# Patient Record
Sex: FEMALE | Race: BLACK OR AFRICAN AMERICAN
[De-identification: names, ages, dates, MRNs, and addresses within clinical notes are randomized per-mention and may not be internally consistent; named-entity substitution may affect disease eponyms.]

---

## 2022-12-28 ENCOUNTER — HOSPITAL ENCOUNTER (EMERGENCY)
Dept: HOSPITAL 97 - ER | Age: 37
Discharge: HOME | End: 2022-12-28
Payer: COMMERCIAL

## 2022-12-28 VITALS — TEMPERATURE: 98.3 F

## 2022-12-28 VITALS — DIASTOLIC BLOOD PRESSURE: 69 MMHG | OXYGEN SATURATION: 98 % | SYSTOLIC BLOOD PRESSURE: 113 MMHG

## 2022-12-28 DIAGNOSIS — O26.891: Primary | ICD-10-CM

## 2022-12-28 DIAGNOSIS — Z3A.08: ICD-10-CM

## 2022-12-28 LAB
ALBUMIN SERPL BCP-MCNC: 3 G/DL (ref 3.4–5)
ALP SERPL-CCNC: 58 U/L (ref 45–117)
ALT SERPL W P-5'-P-CCNC: 19 U/L (ref 13–56)
AST SERPL W P-5'-P-CCNC: 9 U/L (ref 15–37)
BUN BLD-MCNC: 4 MG/DL (ref 7–18)
GLUCOSE SERPLBLD-MCNC: 89 MG/DL (ref 74–106)
HCT VFR BLD CALC: 31.6 % (ref 36–45)
LIPASE SERPL-CCNC: 136 U/L (ref 73–393)
LYMPHOCYTES # SPEC AUTO: 0.6 K/UL (ref 0.7–4.9)
MCV RBC: 75 FL (ref 80–100)
PMV BLD: 9.4 FL (ref 7.6–11.3)
POTASSIUM SERPL-SCNC: 3.6 MMOL/L (ref 3.5–5.1)
RBC # BLD: 4.21 M/UL (ref 3.86–4.86)
SP GR UR: 1.01 (ref 1–1.03)

## 2022-12-28 PROCEDURE — 81015 MICROSCOPIC EXAM OF URINE: CPT

## 2022-12-28 PROCEDURE — 80053 COMPREHEN METABOLIC PANEL: CPT

## 2022-12-28 PROCEDURE — 84702 CHORIONIC GONADOTROPIN TEST: CPT

## 2022-12-28 PROCEDURE — 76817 TRANSVAGINAL US OBSTETRIC: CPT

## 2022-12-28 PROCEDURE — 83690 ASSAY OF LIPASE: CPT

## 2022-12-28 PROCEDURE — 85025 COMPLETE CBC W/AUTO DIFF WBC: CPT

## 2022-12-28 PROCEDURE — 36415 COLL VENOUS BLD VENIPUNCTURE: CPT

## 2022-12-28 PROCEDURE — 81025 URINE PREGNANCY TEST: CPT

## 2022-12-28 PROCEDURE — 81003 URINALYSIS AUTO W/O SCOPE: CPT

## 2022-12-28 NOTE — RAD REPORT
EXAM DESCRIPTION:  US - Transvaginal OB - 12/28/2022 9:18 am

 

CLINICAL HISTORY:  ABD CRAMPING, PREGNANT

 

COMPARISON:  No comparisons

 

FINDINGS:  Single IUP identified. The crown-rump length of the fetus measures 2.5 cm which is consist
ent with 8 weeks 6 day. The gestational sac has a mean sac diameter of 4.9 cm.

 

Fetal heart rate is measured at 169 beats/minute.

 

The uterus measures 12 x 7.2 x 7.9 cm with volume of 358 cc. The right ovary measures 4 x 3.8 x 3.5 c
m with volume of 27.6 cc. The left ovary measures 2.9 x 1.9 x 1.9 cm with volume of 5.5 cc. Bilateral
 ovarian blood flow is present. Right ovarian cyst measuring up to 3.2 cm accounting for discrepancie
s in the size of the ovaries.

 

IMPRESSION:  Single IUP identified with positive fetal heart tones measuring 8 weeks 6 day with SANDHYA o
f 08/03/2023.

 

Bilateral ovarian blood flow present.

## 2022-12-28 NOTE — ER
Nurse's Notes                                                                                     

 [facility]                                                                 

Name: Jorge Flood                                                                               

Age: 37 yrs                                                                                       

Sex: Female                                                                                       

: 1985                                                                                   

MRN: [id number]                                                                                   

Arrival Date: 2022                                                                          

Time: 06:45                                                                                       

Account#: [id number]                                                                            

Bed 4                                                                                             

Private MD:                                                                                       

Diagnosis: Pregnancy related conditions, unspecified                                              

                                                                                                  

Presentation:                                                                                     

                                                                                             

06:55 Chief complaint: Patient states: "I have been having abdominal pain for the past two    tw5 

      days mostly on my right side. I thought it was gas at first but it hasn't gotten            

      better.". Coronavirus screen: Vaccine status: Patient reports receiving the 2nd dose of     

      the covid vaccine. Moderna. Ebola Screen: Patient negative for fever greater than or        

      equal to 101.5 degrees Fahrenheit, and additional compatible Ebola Virus Disease            

      symptoms Patient denies exposure to infectious person. Patient denies travel to an          

      Ebola-affected area in the 21 days before illness onset. Initial Sepsis Screen:. Risk       

      Assessment: Do you want to hurt yourself or someone else? Patient reports no desire to      

      harm self or others. Onset of symptoms was 2022.                               

06:55 Method Of Arrival: Ambulatory                                                           tw5 

06:55 Acuity: NICHOLE 3                                                                           tw5 

                                                                                                  

Triage Assessment:                                                                                

06:57 General: Appears uncomfortable, Behavior is calm, cooperative, appropriate for age.     tw5 

      Pain: Complains of pain in right upper quadrant and right lower quadrant Pain currently     

      is 8 out of 10 on a pain scale. GI: Reports cramping.                                       

                                                                                                  

OB/GYN:                                                                                           

06:57 LMP 2022                                                                           tw5 

                                                                                                  

Historical:                                                                                       

- Allergies:                                                                                      

06:57 Bactrim;                                                                                tw5 

06:57 Latex, Natural Rubber;                                                                  tw5 

06:57 PENICILLINS;                                                                            tw5 

- PMHx:                                                                                           

06:57 Hypertensive disorder; Anemia;                                                          tw5 

- PSHx:                                                                                           

06:57 gastric bypass;                                                                         tw5 

                                                                                                  

- Immunization history:: Flu vaccine is up to date.                                               

- Social history:: Smoking status: Patient denies any tobacco usage or history of.                

- Family history:: not pertinent.                                                                 

                                                                                                  

                                                                                                  

Assessment:                                                                                       

07:54 General: Appears in no apparent distress. comfortable, Behavior is calm, cooperative,   kr3 

      appropriate for age. Pain: Complains of pain in abdomen. Neuro: Level of Consciousness      

      is awake, alert, obeys commands, Oriented to person, place, time, situation.                

      Cardiovascular: Patient's skin is warm and dry. Respiratory: Airway is patent               

      Respiratory effort is even, unlabored, Respiratory pattern is regular, symmetrical. GI:     

      Abdomen is round non-distended. : No signs and/or symptoms were reported regarding        

      the genitourinary system. EENT: No signs and/or symptoms were reported regarding the        

      EENT system. Derm: No signs and/or symptoms reported regarding the dermatologic system.     

      Musculoskeletal: Circulation, motion, and sensation intact.                                 

                                                                                                  

Vital Signs:                                                                                      

06:55 Weight 123.38 kg; Height 5 ft. 8 in. (172.72 cm);                                       tw5 

07:10  / 86; Pulse 71; Resp 18; Temp 98.3; Pulse Ox 97% on R/A;                         kr3 

07:55  / 69; Pulse 68; Resp 18; Pulse Ox 98% on R/A;                                    kr3 

06:55 Body Mass Index 41.36 (123.38 kg, 172.72 cm)                                            tw5 

                                                                                                  

ED Course:                                                                                        

06:45 Patient arrived in ED.                                                                  jj6 

06:54 Porsche Rosenbaum is Primary Nurse.                                                         tw5 

06:57 Triage completed.                                                                       tw5 

06:57 Arm band placed on Patient placed in an exam room.                                      tw5 

06:59 Patient has correct armband on for positive identification. Placed in gown. Bed in low  tw5 

      position. Call light in reach. Side rails up X 1.                                           

07:05 Valentino Ibarra MD is Attending Physician.                                            rt  

07:28 UA MICROSCOPIC Sent.                                                                    kr3 

07:28 Inserted saline lock: 22 gauge in left antecubital area, using aseptic technique. Blood kr3 

      collected.                                                                                  

09:20 Transvaginal OB US In Process Unspecified.                                              EDMS

09:49 No provider procedures requiring assistance completed. IV discontinued, intact,         iw  

      bleeding controlled, No redness/swelling at site. Pressure dressing applied.                

                                                                                                  

Administered Medications:                                                                         

07:38 Drug: Zofran (Ondansetron) 4 mg Route: IVP; Site: left antecubital;                     kr3 

07:38 Drug: NS 0.9% 1000 ml Route: IV; Rate: 1 bolus; Site: left antecubital;                 kr3 

                                                                                                  

                                                                                                  

Outcome:                                                                                          

09:36 Discharge ordered by MD.                                                                rt  

10:03 Patient left the ED.                                                                    iw  

                                                                                                  

Signatures:                                                                                       

Dispatcher [employer]                           EDMS                                                 

Meenakshi Rowland RN                     RN   iw                                                   

Porsche Rosenbaum                                tw5                                                  

Deisy [id number]                                                  

Bethanie Cid, RN                        RN   kr3                                                  

Valentino Ibarra MD MD   rt                                                   

                                                                                                  

Corrections: (The following items were deleted from the chart)                                    

07:41 07:28 Inserted saline lock: 22 gauge in right antecubital area, using aseptic           kr3 

      technique. Blood collected. kr3                                                             

                                                                                                  

**************************************************************************************************

## 2022-12-28 NOTE — EDPHYS
Physician Documentation                                                                           

 [facility]                                                                 

Name: Jorge Flood                                                                               

Age: 37 yrs                                                                                       

Sex: Female                                                                                       

: 1985                                                                                   

MRN: [id number]                                                                                   

Arrival Date: 2022                                                                          

Time: 06:45                                                                                       

Account#: [id number]                                                                            

Bed 4                                                                                             

Private MD:                                                                                       

ED Physician Valentino Ibarra                                                                     

HPI:                                                                                              

                                                                                             

07:51 This 37 yrs old Black Female presents to ER via Ambulatory with complaints of Abdominal rt  

      Cramping.                                                                                   

07:51 Presents to the ED with 2 days of constant abdominal cramping, nonradiating. Symptoms   rt  

      are neither improving nor worsening. She states that she gets nauseated with eating but     

      states that the pain is not worse with eating. She denies other aggravating or              

      alleviating factors. Denies other associated symptoms. Symptoms are moderate severity,      

      no other aggravating alleviating factors..                                                  

                                                                                                  

OB/GYN:                                                                                           

06:57 LMP 2022                                                                           tw5 

                                                                                                  

Historical:                                                                                       

- Allergies:                                                                                      

06:57 Bactrim;                                                                                tw5 

06:57 Latex, Natural Rubber;                                                                  tw5 

06:57 PENICILLINS;                                                                            tw5 

- PMHx:                                                                                           

06:57 Hypertensive disorder; Anemia;                                                          tw 

- PSHx:                                                                                           

06:57 gastric bypass;                                                                         tw5 

                                                                                                  

- Immunization history:: Flu vaccine is up to date.                                               

- Social history:: Smoking status: Patient denies any tobacco usage or history of.                

- Family history:: not pertinent.                                                                 

                                                                                                  

                                                                                                  

ROS:                                                                                              

07:51 Constitutional: Negative for fever, chills, and weight loss, Eyes: Negative for injury, rt  

      pain, redness, and discharge, ENT: Negative for injury, pain, and discharge, Neck:          

      Negative for injury, pain, and swelling, Cardiovascular: Negative for chest pain,           

      palpitations, and edema, Respiratory: Negative for shortness of breath, cough,              

      wheezing, and pleuritic chest pain, : Negative for injury, bleeding, discharge, and       

      swelling, MS/Extremity: Negative for injury and deformity, Skin: Negative for injury,       

      rash, and discoloration, Neuro: Negative for headache, weakness, numbness, tingling,        

      and seizure, Psych: Negative for depression, anxiety, suicide ideation, homicidal           

      ideation, and hallucinations.                                                               

07:51 Abdomen/GI: Positive for abdominal pain, nausea.                                            

                                                                                                  

Exam:                                                                                             

07:51 Constitutional:  This is a well developed, well nourished patient who is awake, alert,  rt  

      and in no acute distress. Head/Face:  Normocephalic, atraumatic. Eyes:  Pupils equal        

      round and reactive to light, extra-ocular motions intact.  Lids and lashes normal.          

      Conjunctiva and sclera are non-icteric and not injected.  Cornea within normal limits.      

      Periorbital areas with no swelling, redness, or edema. ENT:  Nares patent. No nasal         

      discharge, no septal abnormalities noted.  Tympanic membranes are normal and external       

      auditory canals are clear.  Oropharynx with no redness, swelling, or masses, exudates,      

      or evidence of obstruction, uvula midline.  Mucous membranes moist. Neck:  Trachea          

      midline, no thyromegaly or masses palpated, and no cervical lymphadenopathy.  Supple,       

      full range of motion without nuchal rigidity, or vertebral point tenderness.  No            

      Meningismus. Chest/axilla:  Normal chest wall appearance and motion.  Nontender with no     

      deformity.  No lesions are appreciated. Cardiovascular:  Regular rate and rhythm with a     

      normal S1 and S2.  No gallops, murmurs, or rubs.  Normal PMI, no JVD.  No pulse             

      deficits. Respiratory:  Lungs have equal breath sounds bilaterally, clear to                

      auscultation and percussion.  No rales, rhonchi or wheezes noted.  No increased work of     

      breathing, no retractions or nasal flaring. Skin:  Warm, dry with normal turgor.            

      Normal color with no rashes, no lesions, and no evidence of cellulitis. MS/ Extremity:      

      Pulses equal, no cyanosis.  Neurovascular intact.  Full, normal range of motion. Neuro:     

       Awake and alert, GCS 15, oriented to person, place, time, and situation.  Cranial          

      nerves II-XII grossly intact.  Motor strength 5/5 in all extremities.  Sensory grossly      

      intact.  Cerebellar exam normal.  Normal gait. Psych:  Awake, alert, with orientation       

      to person, place and time.  Behavior, mood, and affect are within normal limits.            

07:51 Abdomen/GI: Mild abdominal tenderness diffusely, no rebound, guarding, distention.          

                                                                                                  

Vital Signs:                                                                                      

06:55 Weight 123.38 kg; Height 5 ft. 8 in. (172.72 cm);                                       tw5 

07:10  / 86; Pulse 71; Resp 18; Temp 98.3; Pulse Ox 97% on R/A;                         kr3 

07:55  / 69; Pulse 68; Resp 18; Pulse Ox 98% on R/A;                                    kr3 

06:55 Body Mass Index 41.36 (123.38 kg, 172.72 cm)                                            tw5 

                                                                                                  

MDM:                                                                                              

07:12 Patient medically screened.                                                             rt  

09:43 Differential diagnosis: Pregnancy, cholecystitis, appendicitis, bowel obstruction. Data rt  

      reviewed: vital signs, nurses notes, lab test result(s), radiologic studies. ED course:     

      Presents to the ED with generalized abdominal pain. She has a nonperitoneal abdominal       

      examination. Labs are benign. UPT is found to be positive. For that reason, I believe       

      that the risk of radiation to the fetus is greater than the risk of a missed surgical       

      pathology such as an acute appendicitis. Ultrasound was obtained that shows a live IUP,     

      right ovarian cyst. I discussed these findings with the patient. There is no vaginal        

      bleeding. Patient stable for outpatient care, return precautions discussed..                

                                                                                                  

                                                                                             

07:13 Order name: CBC with Diff; Complete Time: 08:11                                         rt  

                                                                                             

07:13 Order name: CMP; Complete Time: 08:11                                                   rt  

                                                                                             

07:13 Order name: Lipase; Complete Time: 08:11                                                rt  

                                                                                             

07:13 Order name: UA MICROSCOPIC; Complete Time: 08:11                                        rt  

                                                                                             

07:15 Order name: Urine Dipstick-Ancillary; Complete Time: 08:11                              EDMS

                                                                                             

07:26 Order name: Urine Pregnancy--Ancillary (enter results); Complete Time: 09:32            eb  

                                                                                             

07:13 Order name: Urine Dipstick-Ancillary (obtain specimen); Complete Time: 07:16            rt  

                                                                                             

07:13 Order name: Urine Pregnancy Test (obtain specimen); Complete Time: 07:16                rt  

                                                                                             

08:34 Order name: HCG-Quantitative; Complete Time: 09:44                                      rt  

                                                                                             

08:34 Order name: Transvaginal OB US; Complete Time: 09:32                                    rt  

                                                                                                  

Administered Medications:                                                                         

07:38 Drug: Zofran (Ondansetron) 4 mg Route: IVP; Site: left antecubital;                     kr3 

07:38 Drug: NS 0.9% 1000 ml Route: IV; Rate: 1 bolus; Site: left antecubital;                 kr3 

                                                                                                  

                                                                                                  

Disposition Summary:                                                                              

22 09:36                                                                                    

Discharge Ordered                                                                                 

      Location: Home                                                                          rt  

      Problem: new                                                                            rt  

      Symptoms: have improved                                                                 rt  

      Condition: Stable                                                                       rt  

      Diagnosis                                                                                   

        - Pregnancy related conditions, unspecified                                           rt  

      Followup:                                                                               rt  

        - With: Private Physician                                                                  

        - When: 5 - 6 days                                                                         

        - Reason:                                                                                  

      Discharge Instructions:                                                                     

        - Discharge Summary Sheet                                                             rt  

        - Abdominal Pain During Pregnancy                                                     rt  

        - First Trimester of Pregnancy                                                        rt  

      Forms:                                                                                      

        - Medication Reconciliation Form                                                      rt  

        - Work release form                                                                   iw  

        - Thank You Letter                                                                    rt  

        - Antibiotic Education                                                                rt  

        - Prescription Opioid Use                                                             rt  

      Prescriptions:                                                                              

        - Zofran 4 mg Oral Tablet                                                                  

            - take 1 tablet by ORAL route every 12 hours As needed; 20 tablet; Refills: 0,    rt  

      Product Selection Permitted                                                                 

Signatures:                                                                                       

Dispatcher [employer]                           Porsche Contreras                                tw5                                                  

Btehanie Cid, RN                        RN   kr3                                                  

Valentino Ibarra MD MD   rt                                                   

                                                                                                  

Corrections: (The following items were deleted from the chart)                                    

08:10 07:13 Abdomen Pelvis W [id number]+CT.RAD.BRZ ordered. EDMS                                     EDMS

                                                                                                  

**************************************************************************************************